# Patient Record
Sex: FEMALE | Race: ASIAN
[De-identification: names, ages, dates, MRNs, and addresses within clinical notes are randomized per-mention and may not be internally consistent; named-entity substitution may affect disease eponyms.]

---

## 2019-06-16 ENCOUNTER — HOSPITAL ENCOUNTER (EMERGENCY)
Dept: HOSPITAL 25 - UCEAST | Age: 64
Discharge: HOME | End: 2019-06-16
Payer: COMMERCIAL

## 2019-06-16 VITALS — SYSTOLIC BLOOD PRESSURE: 140 MMHG | DIASTOLIC BLOOD PRESSURE: 92 MMHG

## 2019-06-16 DIAGNOSIS — J40: Primary | ICD-10-CM

## 2019-06-16 DIAGNOSIS — I10: ICD-10-CM

## 2019-06-16 DIAGNOSIS — J06.9: ICD-10-CM

## 2019-06-16 DIAGNOSIS — R50.9: ICD-10-CM

## 2019-06-16 PROCEDURE — 99202 OFFICE O/P NEW SF 15 MIN: CPT

## 2019-06-16 PROCEDURE — G0463 HOSPITAL OUTPT CLINIC VISIT: HCPCS

## 2019-06-16 NOTE — UC
Respiratory Complaint HPI





- HPI Summary


HPI Summary: 


64-year-old woman comes in with a chief complaint of upper respiratory tract 

infection symptoms for 6 days.  Patient started with rhinorrhea and a headache.

  Now she is having more chest congestion.  She's been continuing to have 

fevers.  She's also been having body aches.  Ibuprofen helps with the symptoms. 

No headache now. Some upper thoracic back pain, no neck pain at this time.





- History of Current Complaint


Chief Complaint: UCRespiratory


Stated Complaint: FEVER AND COUGH


Time Seen by Provider: 06/16/19 17:38


Hx Last Menstrual Period: NOW


Pain Intensity: 3





- Allergies/Home Medications


Allergies/Adverse Reactions: 


 Allergies











Allergy/AdvReac Type Severity Reaction Status Date / Time


 


No Known Allergies Allergy   Verified 06/16/19 17:41











Home Medications: 


 Home Medications





amLODIPine TAB* [Norvasc 5 mg TAB*] 2.5 mg PO DAILY 06/16/19 [History Confirmed 

06/16/19]











PMH/Surg Hx/FS Hx/Imm Hx


Previously Healthy: Yes


Cardiovascular History: Hypertension





- Surgical History


Surgical History: Yes


Surgery Procedure, Year, and Place: LAPAROSCOPY, TONSILLECTOMY





- Family History


Known Family History: Positive: Non-Contributory





- Social History


Alcohol Use: Occasionally


Substance Use Type: None


Smoking Status (MU): Never Smoked Tobacco





Review of Systems


All Other Systems Reviewed And Are Negative: Yes


Constitutional: Positive: Fever, Chills, Fatigue


Skin: Positive: Negative


Eyes: Positive: Negative


ENT: Positive: Nasal Discharge, Sinus Congestion


Respiratory: Positive: Cough, Other - SEE HPI


Cardiovascular: Positive: Negative


Gastrointestinal: Positive: Negative


Motor: Positive: Negative


Neurovascular: Positive: Negative


Musculoskeletal: Positive: Myalgia


Neurological: Positive: Headache


Psychological: Positive: Negative


Is Patient Immunocompromised?: No





Physical Exam


Triage Information Reviewed: Yes


Appearance: No Pain Distress, Well-Nourished, Ill-Appearing - mild


Vital Signs: 


 Initial Vital Signs











Temp  100.4 F   06/16/19 17:34


 


Pulse  88   06/16/19 17:34


 


Resp  12   06/16/19 17:34


 


BP  140/92   06/16/19 17:34


 


Pulse Ox  99   06/16/19 17:34











Vital Signs Reviewed: Yes


Eye Exam: Normal


Eyes: Positive: Conjunctiva Clear


ENT: Positive: Pharyngeal erythema, Nasal congestion, TMs normal


Neck: Positive: Supple


Respiratory: Positive: Lungs clear, Normal breath sounds, No respiratory 

distress, Other: - Cough


Cardiovascular: Positive: RRR


Musculoskeletal Exam: Normal


Musculoskeletal: Positive: Strength Intact, ROM Intact


Neurological Exam: Normal


Neurological: Positive: Alert, Muscle Tone Normal


Psychological Exam: Normal


Psychological: Positive: Age Appropriate Behavior


Skin Exam: Normal





Respiratory Course/Dx





- Course


Course Of Treatment: 





DISCUSSED VIRAL VERSES BACTERIAL INFECTION AND THE ROLE OF ANTIBIOTICS. 


THE PATIENT PREFERS TO BE ON ANTIBIOTICS AT THIS TIME.


Patient continues to have fevers.  She also has a cough that occasionally is 

productive.  No headache at this time.  Her rhinorrhea has been clear and some 

yellow.  Most probable cause of the fevers is the upper respiratory tract 

infection and bronchitis.  I let the patient know that if she got worse she 

should get reevaluated in the emergency department.





- Differential Dx/Diagnosis


Provider Diagnosis: 


 Bronchitis, Upper respiratory infection, Febrile illness








Discharge





- Sign-Out/Discharge


Documenting (check all that apply): Patient Departure


All imaging exams completed and their final reports reviewed: No Studies





- Discharge Plan


Condition: Stable


Disposition: HOME


Prescriptions: 


DOXYcycline CAP(*) [DOXYcycline 100MG CAP(*)] 100 mg PO BID #18 cap


Patient Education Materials:  Fever in Adults (ED), Upper Respiratory Infection 

(ED), Acute Bronchitis (ED)


Referrals: 


Lg Scott MD [Primary Care Provider] - 


Additional Instructions: 


FOLLOW UP WITH YOUR DOCTOR IF NOT COMPLETELY IMPROVED.


GO TO THE EMERGENCY DEPARTMENT IF YOUR CONDITION WORSENS; YOU DO NOT IMPROVE, 

SHORTNESS OF BREATH, YOU FEEL ILL OR ANY QUESTIONS OR CONCERNS.








- Billing Disposition and Condition


Condition: STABLE


Disposition: Home